# Patient Record
Sex: MALE | Race: WHITE | NOT HISPANIC OR LATINO | Employment: FULL TIME | URBAN - METROPOLITAN AREA
[De-identification: names, ages, dates, MRNs, and addresses within clinical notes are randomized per-mention and may not be internally consistent; named-entity substitution may affect disease eponyms.]

---

## 2019-07-28 ENCOUNTER — HOSPITAL ENCOUNTER (EMERGENCY)
Facility: CLINIC | Age: 69
Discharge: HOME | End: 2019-07-28
Attending: EMERGENCY MEDICINE | Admitting: EMERGENCY MEDICINE
Payer: COMMERCIAL

## 2019-07-28 VITALS
OXYGEN SATURATION: 96 % | HEIGHT: 74 IN | DIASTOLIC BLOOD PRESSURE: 81 MMHG | TEMPERATURE: 98 F | RESPIRATION RATE: 18 BRPM | SYSTOLIC BLOOD PRESSURE: 126 MMHG | BODY MASS INDEX: 30.16 KG/M2 | WEIGHT: 235 LBS

## 2019-07-28 DIAGNOSIS — J06.9 UPPER RESPIRATORY TRACT INFECTION, UNSPECIFIED TYPE: ICD-10-CM

## 2019-07-28 LAB
DEPRECATED S PYO AG THROAT QL EIA: NORMAL
PLASMODIUM AG BLD QL IA: NEGATIVE
SPECIMEN SOURCE: NORMAL

## 2019-07-28 PROCEDURE — 87899 AGENT NOS ASSAY W/OPTIC: CPT | Performed by: EMERGENCY MEDICINE

## 2019-07-28 PROCEDURE — 99283 EMERGENCY DEPT VISIT LOW MDM: CPT

## 2019-07-28 PROCEDURE — 87015 SPECIMEN INFECT AGNT CONCNTJ: CPT | Performed by: EMERGENCY MEDICINE

## 2019-07-28 PROCEDURE — 87207 SMEAR SPECIAL STAIN: CPT | Performed by: EMERGENCY MEDICINE

## 2019-07-28 PROCEDURE — 87081 CULTURE SCREEN ONLY: CPT | Performed by: EMERGENCY MEDICINE

## 2019-07-28 PROCEDURE — 87880 STREP A ASSAY W/OPTIC: CPT | Performed by: EMERGENCY MEDICINE

## 2019-07-28 ASSESSMENT — ENCOUNTER SYMPTOMS
WEAKNESS: 1
CHILLS: 1
FEVER: 1
SORE THROAT: 1
HEADACHES: 1
FATIGUE: 1

## 2019-07-28 ASSESSMENT — MIFFLIN-ST. JEOR: SCORE: 1905.7

## 2019-07-28 NOTE — DISCHARGE INSTRUCTIONS
Push fluids, rest, vitamin C.  Gargle with warm salt water and/or Listerine.  Ibuprofen as needed.  Recheck in the clinic over the next 3 to 4 days if symptoms worsen.  You will be called only if your malaria screen comes back positive.

## 2019-07-28 NOTE — ED AVS SNAPSHOT
Emergency Department  [address] [facility] [address]  Phone:  [phone]  Fax:  [phone]                                    Edwar Overton   MRN: [id number]    Department:   Emergency Department   Date of Visit:  7/28/2019           After Visit Summary Signature Page    I have received my discharge instructions, and my questions have been answered. I have discussed any challenges I see with this plan with the nurse or doctor.    ..........................................................................................................................................  Patient/Patient Representative Signature      ..........................................................................................................................................  Patient Representative Print Name and Relationship to Patient    ..................................................               ................................................  Date                                   Time    ..........................................................................................................................................  Reviewed by Signature/Title    ...................................................              ..............................................  Date                                               Time          22EPIC Rev 08/18

## 2019-07-28 NOTE — ED PROVIDER NOTES
"  History     Chief Complaint:  Pharyngitis and Generalized Weakness    HPI   Edwar Overton is a 69 year old male who presents with pharyngitis and generalized weakness. 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 He notes that he has been there 3 times. Since yesterday, he reports sore throat, congestion, fever since yesterday. Here, he notes that he has concern for malaria, noting that a couple of the locals had it. He states that he has never had malaria before. He was taking the weekly dose of malaria preventative medication. He also endorses some chills, fatigue, cold sweats over the night, and a small headache.     Allergies:  No Known Drug Allergies    Medications:    Medications reviewed. No current medications.     Past Medical History:     Medical history reviewed. No pertinent medical history.    Past Surgical History:    Surgical history reviewed. No pertinent surgical history.    Family History:    Family history reviewed. No pertinent family history.     Social History:  Smoking status: Never smoker  Alcohol use: Yes  Drug use: No  Presents to the ED with his spouse  Marital Status:       Review of Systems   Constitutional: Positive for chills, fatigue and fever.   HENT: Positive for congestion and sore throat.    Neurological: Positive for weakness and headaches.   All other systems reviewed and are negative.      Physical Exam     Patient Vitals for the past 24 hrs:   BP Temp Temp src Heart Rate Resp SpO2 Height Weight   07/28/19 0634 126/81 98  F (36.7  C) Oral 76 18 96 % 1.88 m (6' 2\") 106.6 kg (235 lb)       Physical Exam  Nursing note and vitals reviewed.  Constitutional:  Alert.  Appears well-developed and well-nourished, comfortable.   HENT:   Nose:    Nose normal.  Mouth/Throat:  Mild erythema in pharynx, otherwise unremarkable.  Eyes:    Conjunctivae are normal.      Right eye exhibits no discharge. " Left eye exhibits no discharge.   Cardiovascular:  Normal rate, regular rhythm.      Normal heart sounds.     No murmur heard.  Pulmonary/Chest:  Breath sounds clear. No respiratory distress.     No tenderness. No stridor.   Lymph:     Neurological:   Alert and appropriate. No focal weakness.  Skin:    Skin is warm and dry. No rash noted. No diaphoresis.      No erythema. No pallor.   Psychiatric:   Behavior is normal. Judgment and thought content normal.     Emergency Department Course   Laboratory:  Malaria screen rapid: In process  Parasite stain: In process  Rapid strep screen: Throat, positive.   Beta strep group A culture: In process    Emergency Department Course:  Past medical records, nursing notes, and vitals reviewed.  0650: I performed an exam of the patient and obtained history, as documented above.    IV inserted and blood drawn.    0901: I rechecked the patient. Findings and plan explained to the patient. Patient discharged home with instructions regarding supportive care, medications, and reasons to return. The importance of close follow-up was reviewed.     Impression & Plan    Medical Decision Making:  Patient comes in after recently returning from [facility].  He has had upper respiratory type symptoms with runny nose sore throat may be a low-grade fever.  His exam is otherwise normal other than a slightly red throat.  Strep is negative.  He wanted to be tested for malaria although he took preventative medication.  I sent off the malaria screen and he will be called if this is positive.  My suspicion is very low.  Otherwise conservative treatment is recommended.      Diagnosis:    ICD-10-CM    1. Upper respiratory tract infection, unspecified type J06.9        Disposition: Discharged to home    Push fluids, rest, vitamin C.  Gargle with warm salt water and/or Listerine.  Ibuprofen as needed.  Recheck in the clinic over the next 3 to 4 days if symptoms worsen.  You will be called only if your malaria  screen comes back positive.    I, Karolina Garza, am serving as a scribe at 6:50 AM on 7/28/2019 to document services personally performed by Ingrid Domingo MD based on my observations and the provider's statements to me.       Karolina Garza  7/28/2019    EMERGENCY DEPARTMENT       Ingrid Domingo MD  07/28/19 0937

## 2019-07-28 NOTE — ED TRIAGE NOTES
Pt recently returned from trip to [place]. Returned on the 25th. States has a sore throat, body aches and generalized weakness. States some others had contracted Malaria

## 2019-07-29 LAB
PARASITE SPEC INSPECT: NORMAL
SPECIMEN SOURCE: NORMAL

## 2019-07-29 NOTE — RESULT ENCOUNTER NOTE
Preliminary Beta strep group A r/o culture is PENDING and/or NEGATIVE at this time.   No changes in treatment per [facility] Strep protocol.

## 2019-07-30 LAB
BACTERIA SPEC CULT: NORMAL
Lab: NORMAL
SPECIMEN SOURCE: NORMAL

## 2023-03-14 ENCOUNTER — OFFICE VISIT (OUTPATIENT)
Dept: URGENT CARE | Facility: URGENT CARE | Age: 73
End: 2023-03-14
Payer: MEDICARE

## 2023-03-14 VITALS
TEMPERATURE: 97.5 F | OXYGEN SATURATION: 96 % | SYSTOLIC BLOOD PRESSURE: 132 MMHG | HEART RATE: 75 BPM | DIASTOLIC BLOOD PRESSURE: 82 MMHG

## 2023-03-14 DIAGNOSIS — J01.40 ACUTE NON-RECURRENT PANSINUSITIS: Primary | ICD-10-CM

## 2023-03-14 PROBLEM — Z87.891 HISTORY OF TOBACCO USE: Status: ACTIVE | Noted: 2023-03-14

## 2023-03-14 PROCEDURE — 99203 OFFICE O/P NEW LOW 30 MIN: CPT

## 2023-03-15 NOTE — PROGRESS NOTES
Chief Complaint   Patient presents with     Headache     Taking Aleve, pain x 2 weeks, rarely get headaches, no other Sx.           ICD-10-CM    1. Acute non-recurrent pansinusitis  J01.40 amoxicillin-clavulanate (AUGMENTIN) 875-125 MG tablet      Antibiotics as prescribed.  Increase water intake recheck in 10 days if symptoms have not completely resolved.    Subjective     Edwar Overton is an 72 year old male who presents to clinic today for 2-week history of nasal congestion now with increasing facial pressure and pain as well as fatigue.  He denies any fever, chills, facial trauma.      Objective    /82 (BP Location: Left arm, Patient Position: Sitting, Cuff Size: Adult Large)   Pulse 75   Temp 97.5  F (36.4  C) (Tympanic)   SpO2 96%   Nurses notes and VS have been reviewed.    Physical Exam       GENERAL APPEARANCE: healthy appearing, alert     EYES: PERRL, EOMI, sclera non-icteric     HENT: Fluids present in left tympanic membrane, right tympanic membrane appears normal, bilateral canals are normal, yellow purulent drainage in nose, maxillary sinus tenderness with palpation, postnasal drip noted and pharynx     NECK: no adenopathy or asymmetry, thyroid normal to palpation     SKIN: no suspicious lesions or rashes    Patient Instructions   Use Netti pot twice a day for the next 3-5 days. Use distilled water and the packets of powder included with the pot.    Take Tylenol or Ibuprofen as needed for fever or pain.    Drink Plenty of water and rest.       DOREEN Castillo, CNP  [facility] Provider    The use of Dragon/[employer] dictation services may have been used to construct the content in this note; any grammatical or spelling errors are non-intentional. Please contact the author of this note directly if you are in need of any clarification.

## 2023-03-15 NOTE — PATIENT INSTRUCTIONS
Use Netti pot twice a day for the next 3-5 days. Use distilled water and the packets of powder included with the pot.    Take Tylenol or Ibuprofen as needed for fever or pain.    Drink Plenty of water and rest.